# Patient Record
Sex: FEMALE | Race: WHITE
[De-identification: names, ages, dates, MRNs, and addresses within clinical notes are randomized per-mention and may not be internally consistent; named-entity substitution may affect disease eponyms.]

---

## 2018-08-21 ENCOUNTER — HOSPITAL ENCOUNTER (OUTPATIENT)
Dept: HOSPITAL 5 - TRG | Age: 38
Discharge: HOME | End: 2018-08-21
Attending: OBSTETRICS & GYNECOLOGY
Payer: SELF-PAY

## 2018-08-21 VITALS — SYSTOLIC BLOOD PRESSURE: 116 MMHG | DIASTOLIC BLOOD PRESSURE: 76 MMHG

## 2018-08-21 DIAGNOSIS — Z3A.38: ICD-10-CM

## 2018-08-21 DIAGNOSIS — O47.1: Primary | ICD-10-CM

## 2018-08-21 PROCEDURE — 76816 OB US FOLLOW-UP PER FETUS: CPT

## 2018-08-21 PROCEDURE — 59025 FETAL NON-STRESS TEST: CPT

## 2018-08-21 PROCEDURE — 76819 FETAL BIOPHYS PROFIL W/O NST: CPT

## 2018-08-21 NOTE — ULTRASOUND REPORT
OB ULTRASOUND



History JAZMINE, EFW.



Technique: Transabdominal ultrasound with Doppler interrogation.





Gestation: Single



Fetal Position: Cephalic



Amniotic Fluid: Normal

  JAZMINE = 10.2 cm



Placenta: Fundal

  Placental Grade: 3



Fetal Heart Rate:

  162 BPM





BPD: 8.9 cm = 36 w 1 d



 HC: 32.5 cm = 36 w 6 d



 AC: 32.3 cm = 36 w 1 d



 FL: 7.5 cm = 38 w 2 d



HC/AC Ratio: 1.01



Cephalic Index: 74.0



Estimated Fetal Weight: 3034 grams





Clinical age = 39 w 0 d      EDC: 8/28/18



US Gest. Age = 36 w 6 d      EDC: 9/12/18





IMPRESSION: Viable, single intrauterine pregnancy as described.

## 2018-08-22 ENCOUNTER — HOSPITAL ENCOUNTER (INPATIENT)
Dept: HOSPITAL 5 - APU | Age: 38
LOS: 2 days | Discharge: HOME | End: 2018-08-24
Attending: OBSTETRICS & GYNECOLOGY | Admitting: OBSTETRICS & GYNECOLOGY
Payer: COMMERCIAL

## 2018-08-22 DIAGNOSIS — O34.211: Primary | ICD-10-CM

## 2018-08-22 DIAGNOSIS — Z3A.39: ICD-10-CM

## 2018-08-22 DIAGNOSIS — D64.9: ICD-10-CM

## 2018-08-22 LAB
BASOPHILS # (AUTO): 0 K/MM3 (ref 0–0.1)
BASOPHILS NFR BLD AUTO: 0.3 % (ref 0–1.8)
EOSINOPHIL # BLD AUTO: 0.1 K/MM3 (ref 0–0.4)
EOSINOPHIL NFR BLD AUTO: 1.6 % (ref 0–4.3)
HCT VFR BLD CALC: 38.2 % (ref 30.3–42.9)
HGB BLD-MCNC: 12.7 GM/DL (ref 10.1–14.3)
LYMPHOCYTES # BLD AUTO: 1.5 K/MM3 (ref 1.2–5.4)
LYMPHOCYTES NFR BLD AUTO: 15.6 % (ref 13.4–35)
MCH RBC QN AUTO: 30 PG (ref 28–32)
MCHC RBC AUTO-ENTMCNC: 33 % (ref 30–34)
MCV RBC AUTO: 90 FL (ref 79–97)
MONOCYTES # (AUTO): 0.4 K/MM3 (ref 0–0.8)
MONOCYTES % (AUTO): 4.3 % (ref 0–7.3)
PLATELET # BLD: 232 K/MM3 (ref 140–440)
RBC # BLD AUTO: 4.24 M/MM3 (ref 3.65–5.03)

## 2018-08-22 PROCEDURE — 86850 RBC ANTIBODY SCREEN: CPT

## 2018-08-22 PROCEDURE — 59025 FETAL NON-STRESS TEST: CPT

## 2018-08-22 PROCEDURE — 99211 OFF/OP EST MAY X REQ PHY/QHP: CPT

## 2018-08-22 PROCEDURE — 96361 HYDRATE IV INFUSION ADD-ON: CPT

## 2018-08-22 PROCEDURE — 96360 HYDRATION IV INFUSION INIT: CPT

## 2018-08-22 PROCEDURE — 85025 COMPLETE CBC W/AUTO DIFF WBC: CPT

## 2018-08-22 PROCEDURE — 86900 BLOOD TYPING SEROLOGIC ABO: CPT

## 2018-08-22 PROCEDURE — 96374 THER/PROPH/DIAG INJ IV PUSH: CPT

## 2018-08-22 PROCEDURE — G0463 HOSPITAL OUTPT CLINIC VISIT: HCPCS

## 2018-08-22 PROCEDURE — 85018 HEMOGLOBIN: CPT

## 2018-08-22 PROCEDURE — 36415 COLL VENOUS BLD VENIPUNCTURE: CPT

## 2018-08-22 PROCEDURE — 86901 BLOOD TYPING SEROLOGIC RH(D): CPT

## 2018-08-22 PROCEDURE — 85014 HEMATOCRIT: CPT

## 2018-08-22 RX ADMIN — SODIUM CHLORIDE, SODIUM LACTATE, POTASSIUM CHLORIDE, AND CALCIUM CHLORIDE SCH MLS/HR: .6; .31; .03; .02 INJECTION, SOLUTION INTRAVENOUS at 11:00

## 2018-08-22 RX ADMIN — SODIUM CHLORIDE, SODIUM LACTATE, POTASSIUM CHLORIDE, AND CALCIUM CHLORIDE SCH MLS/HR: .6; .31; .03; .02 INJECTION, SOLUTION INTRAVENOUS at 10:31

## 2018-08-22 NOTE — HISTORY AND PHYSICAL REPORT
History of Present Illness


Date of examination: 18


Date of admission: 


18 09:15





Chief complaint: 





SIUP at 39 weeks gestation not in labor.


History of present illness: 





Patient is a 37 year old , LMP 17, EDC 18 at 39 weeks 

gestation who was admitted for repeat C/section. She denies any contractions, 

fluid leakage or bleeding. She reports good fetal movement.





Past History


Past Medical History: other (anemia)


Past Surgical History:  section


Family/Genetic History: none


Social history: no significant social history





- Obstetrical History


Expected Date of Delivery: 18


Actual Gestation: 39 Week(s) 0 Day(s) 


: 2


Para: 1


Number of Living Children: 1


  ** #1


Birth year: [birth year]


Birthweight: 2.722 kg


Method of Delivery: 


Gestational age at delivery: 39


Complications: other (pre-eclampsia)





Medications and Allergies


 Allergies











Allergy/AdvReac Type Severity Reaction Status Date / Time


 


No Known Allergies Allergy   Verified 18 12:31











 Home Medications











 Medication  Instructions  Recorded  Confirmed  Last Taken  Type


 


No Known Home Medications [No  18 Unknown History





Reported Home Medications]     











Active Meds: 


Active Medications





Citric Acid/Sodium Citrate (Bicitra)  30 ml PO ONCE NR


   Stop: 18 17:00


Diphenhydramine HCl (Benadryl)  12.5 mg IV Q2H PRN


   PRN Reason: Itching


Famotidine (Pepcid)  20 mg IV ONCE NR


   Stop: 18 17:00


Hydromorphone HCl (Dilaudid)  0.5 mg IV Q5M PRN


   PRN Reason: Breakthrough Pain


   Stop: 18 16:30


Cefazolin Sodium (Ancef/Sterile Water 2 Gm/20 Ml)  2 gm in 20 mls @ 80 mls/hr 

IV PREOP NR; Protocol


   Stop: 18 17:00


Lactated Ringer's (Lactated Ringers)  1,000 mls @ 2,250 mls/hr IV PREOP ELIER


   Stop: 18 10:27


Oxytocin/Sodium Chloride (Pitocin/Ns 20 Unit/1000ml Drip)  20 units in 1,000 

mls @ 0 mls/hr IV TITR ELIER


Ketorolac Tromethamine (Toradol)  30 mg IV Q6H PRN


   PRN Reason: Pain, Moderate (4-6)


   Stop: 18 10:29


Metoclopramide HCl (Reglan)  10 mg IV ONCE NR


   Stop: 18 16:00


Naloxone HCl (Narcan 0.4 Mg/1 Ml)  0.2 mg IV Q2MIN PRN


   PRN Reason: Res Rate </= 8 or 02 SAT < 92%


Ondansetron HCl (Zofran)  4 mg IV Q8H PRN


   PRN Reason: Nausea And Vomiting


Promethazine HCl (Phenergan)  25 mg PO Q6H PRN


   PRN Reason: Nausea And Vomiting


Promethazine HCl (Phenergan)  25 mg NH Q6H PRN


   PRN Reason: Nausea And Vomiting


Sodium Chloride (Sodium Chloride Flush Syringe 10 Ml)  10 ml IV PRN NR











- Vital Signs


Vital signs: 


 Vital Signs











Temp Resp


 


 98.1 F   18 09:53  18 09:53








 











Temp Pulse Resp BP Pulse Ox


 


 98.1 F      18 09:53     18 09:53      














- Physical Exam


Cardiovascular: Normal S1, Normal S2


Lungs: Positive: Clear to auscultation


Vulva: both: normal


Deep Tendon Reflex Grade: Normal +2





- Obstetrical


FHR: category 1


Cervical Dilatation: 0


Cervical Effacement Percentage: 0


Fetal station: -3


Uterine Contraction Pattern: Absent





Results


All other labs normal.








Assessment and Plan





- Patient Problems


(1) 39 weeks gestation of pregnancy


Current Visit: Yes   Status: Acute   





(2) Previous  section


Current Visit: Yes   Status: Acute   


Plan to address problem: 


Admit to labor floor.


Pre-op labs.


IV hydration. Fetal monitoring. Keep NPO.


For repeat C/section. Risks and benefits of the procedure were discussed in 

detail with the patient such as infection, hemorrhage requiring blood 

transfusion, injury to the bowel, bladder and blood vessels. She expressed 

understanding, her questions were answered, she gave her informed consent.








(3) Declines  (vaginal birth after ) trial


Current Visit: Yes   Status: Acute   





(4) Anemia


Current Visit: Yes   Status: Acute   


Qualifiers: 


   Anemia type: iron deficiency

## 2018-08-22 NOTE — OPERATIVE REPORT
Operative Report


Operative Report: 


Preoperative diagnosis:


1.  SIUP at 39 weeks gestation not in labor.


2.  Previous  section.


3.  Declined .





Postoperative diagnosis: Same as preoperative diagnosis.





Procedure: Repeat low transverse  section.





Surgeon: Dr. Fallon





Assistant: none





Anesthesia: spinal.





EBL: 600 cc





IVF: 2 liters of RL





Urine: 100 cc clear





Complications: none





Intraoperative findings:


1.  A female infant found in the PEDRO LUIS position, delivered at 12:15 PM, Apgars 9 

at 1 minutes and 9 at 5 minutes, weight 6 lbs. 1 oz.


2.  Normal fallopian tubes and ovaries bilaterally.





Procedure details:


Risks, benefits, and alternatives of the procedure were discussed in detail 

with the patient which included but not limited to risk of infection, 

hemorrhage requiring blood transfusion, injury to the bowel or bladder and 

blood vessels.  The patient expressed understanding, her questions were 

answered and she gave informed consent.





The patient was taken to the operating room with an IV was infusing Ringer's 

lactate. In the operating room, she was placed in the sitting position and 

given spinal anesthesia.  Then, she was placed in a dorsal supine position with 

a leftward tilt.  Chavira catheter and Venodyne boots were placed.  The abdomen 

was washed and she was prepared and draped in usual sterile fashion.  After 

confirming adequate spinal anesthesia, a Pfannenstiel skin incision was made in 

the lower abdomen at the level of the previous scar using the scalpel.  This 

incision was carried down to the underlying fascia using the Bovie.  The fascia 

was incised bilaterally in a curvilinear fashion using the Bovie.  2 straight 

Kocker clamps were used to grasp the upper edge of the fascia from which the 

underlying rectus abdominis muscle was dissected off using the Bovie.  A 

similar procedure was done with the lateral edge of the fascia to dissect the 

underlying rectus abdominis muscle.  The muscle was  bluntly from the 

midline by pulling.  The parietal peritoneum was grasped with 2 hemostat clamps 

and entered sharply using Metzenbaum scissors.  A quick survey of the anatomy 

revealed a gravid uterus, normal fallopian tubes and ovaries bilaterally.  A 

bladder flap was created.  Hansel'O retractor was placed in the incision for 

proper visualization.  A low transverse incision was made in the lower uterine 

segment using the scalpel and extended bilaterally in a curvilinear fashion 

using bandage scissors. The amniotic sac was ruptured and there was copious 

amount of clear amniotic fluids.  The infant was found in an PEDRO LUIS position.  The 

head was delivered atraumatically followed by the delivery of the shoulders and 

rest of the body at 12:15 PM.  The cord was clamped 2 and cut, the infant was 

handed off to the awaiting pediatrician.  Apgars were 9 at 1 minutes and 9 at 5 

minutes, weight was 6 lbs. 1 oz.  Cord blood was collected.  The placenta was 

delivered by manual traction and it was completed a three-vessel cord.  The 

uterine cavity was cleaned of clots and debris using lap sponges.  The uterine 

incision was repaired in a running locked fashion using 0 Vicryl sutures.  A 

second layer of imbrication was placed.  The gutters were cleaned of clots and 

debris using dry lap sponges.  After confirming adequate hemostasis, the 

instruments were removed from the abdominal cavity.  The fascia was closed in a 

running fashion using 0 Vicryl sutures.  The subcutaneous adipose tissue was 

closed with 2-0 chromic sutures. The skin was closed in a subcutaneous fashion 

using 4-0 Vicryl on a Kun needle.  Sterile dressing was placed.





The counts of laps, needles, sponges and instruments were correct 2.  The 

patient tolerated the procedure well, she was taken to the recovery room in a 

stable condition.

## 2018-08-22 NOTE — ANESTHESIA CONSULTATION
Anesthesia Consult and Med Hx


Date of service: 08/22/18





- Airway


Anesthetic Teeth Evaluation: Good


ROM Head & Neck: Adequate


Mental/Hyoid Distance: Adequate


Mallampati Class: Class II


Intubation Access Assessment: Probably Good





- Pre-Operative Health Status


ASA Pre-Surgery Classification: ASA2


Proposed Anesthetic Plan: Epidural, Spinal





- Pulmonary


Hx Asthma: No





- Cardiovascular System


Hx Hypertension: No





- Central Nervous System


Hx Seizures: No


Hx Psychiatric Problems: No





- Endocrine


Hx Renal Disease: No


Hx Hypothyroidism: No


Hx Hyperthyroidism: No





- Hematic


Hx Anemia: No


Hx Sickle Cell Disease: No





- Other Systems


Hx Alcohol Use: No

## 2018-08-23 LAB
HCT VFR BLD CALC: 32.6 % (ref 30.3–42.9)
HGB BLD-MCNC: 10.9 GM/DL (ref 10.1–14.3)

## 2018-08-23 RX ADMIN — IBUPROFEN PRN MG: 800 TABLET, FILM COATED ORAL at 18:25

## 2018-08-23 RX ADMIN — FERROUS SULFATE TAB 325 MG (65 MG ELEMENTAL FE) SCH MG: 325 (65 FE) TAB at 10:03

## 2018-08-23 RX ADMIN — Medication SCH EACH: at 10:03

## 2018-08-24 VITALS — SYSTOLIC BLOOD PRESSURE: 140 MMHG | DIASTOLIC BLOOD PRESSURE: 87 MMHG

## 2018-08-24 RX ADMIN — Medication SCH EACH: at 11:15

## 2018-08-24 RX ADMIN — IBUPROFEN PRN MG: 800 TABLET, FILM COATED ORAL at 08:46

## 2018-08-24 RX ADMIN — FERROUS SULFATE TAB 325 MG (65 MG ELEMENTAL FE) SCH MG: 325 (65 FE) TAB at 11:15

## 2018-08-24 NOTE — PROGRESS NOTE
Assessment and Plan





- Patient Problems


(1) S/P repeat low transverse 


Current Visit: Yes   Status: Acute   


Plan to address problem: 


POD 2 - stable


Discharge to home today.


F/U at [facility] in 2 weeks for incision check








Subjective





- Subjective


Date of service: 18


Principal diagnosis: S/P Repeat LTCS; POD #2


Patient reports: appetite normal, voiding normally, pain well controlled, flatus

, ambulating normally, no bowel movement


Lyles: doing well, nursing well





Objective





- Vital Signs


Latest vital signs: 


 Vital Signs











  Temp Pulse Resp BP


 


 18 00:05  97.8 F  88  16  94/60


 


 18 23:42    18 


 


 18 17:00  98.4 F  100 H  18  116/68








 Intake and Output











 18





 23:59 07:59 15:59


 


Intake Total  840 


 


Output Total 0  


 


Balance 0 840 


 


Intake:   


 


  Intake, Free Water  840 


 


Output:   


 


  Urine 0  


 


    Indwelling Catheter 0  


 


Other:   


 


  Total, Output Amount 0  


 


  # Voids   


 


    Indwelling Catheter 0  


 


    Void  1 














- Exam


Uterus: Present: normal, firm, fundal height below umbilicus


Extremities: Present: normal


Incision: Present: normal, dry, intact

## 2018-08-24 NOTE — DISCHARGE SUMMARY
Providers





- Providers


Date of Admission: 


18 09:15





Date of discharge: 18


Attending physician: 


MICHAEL MCCALLUM MD





Primary care physician: 


MICHAEL MCCALLUM MD








Hospitalization


Reason for admission:  section, IUP at term


Delivery: 


Procedure: repeat low transverse


Episiotomy: none


Laceration: none


Incision: normal, dry, intact


Other postpartum procedures: none


Postpartum complications: none


Discharge diagnosis: IUP at term delivered


[place] baby: female


Hospital course: 





Uncomplicated


Condition at discharge: Stable


Disposition: DC-01 TO HOME OR SELFCARE





- Discharge Diagnoses


(1) S/P repeat low transverse 


Status: Acute   





Plan





- Discharge Medications


Prescriptions: 


Ibuprofen [Motrin] 800 mg PO Q8HR PRN #20 tablet


 PRN Reason: Pain, Moderate (4-6)


oxyCODONE /ACETAMINOPHEN [Percocet 5/325] 1 tab PO Q4HR #14 tab


oxyCODONE /ACETAMINOPHEN [Percocet 5/325] 1 tab PO Q4HR #14 tab





- Provider Discharge Summary


Activity: routine, no sex for 6 weeks, no heavy lifting 4 weeks, no strenuous 

exercise


Diet: routine


Instructions: routine


Additional instructions: 


[]  Smoking cessation referral if applicable(refer to patient education folder 

for contact #)


[]  Refer to [facility] Booklet








Call your doctor immediately for:


* Fever > 100.5


* Heavy vaginal bleeding ( >1 pad per hour)


* Severe persistent headache


* Shortness of breath


* Reddened, hot, painful area to leg or breast


* Drainage or odor from incision.





* Keep incision clean and dry at all times and follow doctor's instructions 

regarding bathing/showering











- Follow up plan


Follow up: 


MICHAEL MCCALLUM MD [Primary Care Provider] - 14 Days (F/U at [facility] 

in 2 weeks for incision check)

## 2023-09-09 NOTE — ULTRASOUND REPORT
ULTRASOUND BIOPHYSICAL PROFILE:



History: JAZMINE, EFW



Technique:  Transabdominal ultrasound with Doppler interrogation.



2 - Fetal breathing movements



2 - Fetal movements



2 - Fetal posture and tone



2 - Qualitative amniotic fluid volume



8 - TOTAL SCORE OF POSSIBLE 8



Fetal Heart Rate (bpm) 152 Yes